# Patient Record
Sex: MALE | Race: WHITE | NOT HISPANIC OR LATINO | Employment: OTHER | URBAN - METROPOLITAN AREA
[De-identification: names, ages, dates, MRNs, and addresses within clinical notes are randomized per-mention and may not be internally consistent; named-entity substitution may affect disease eponyms.]

---

## 2022-02-11 ENCOUNTER — TRANSFERRED RECORDS (OUTPATIENT)
Dept: HEALTH INFORMATION MANAGEMENT | Facility: CLINIC | Age: 67
End: 2022-02-11
Payer: COMMERCIAL

## 2022-02-14 ENCOUNTER — TRANSCRIBE ORDERS (OUTPATIENT)
Dept: OTHER | Age: 67
End: 2022-02-14

## 2022-02-14 DIAGNOSIS — H43.393 FLOATERS, BILATERAL: Primary | ICD-10-CM

## 2022-02-15 ENCOUNTER — TELEPHONE (OUTPATIENT)
Dept: OPHTHALMOLOGY | Facility: CLINIC | Age: 67
End: 2022-02-15

## 2022-02-15 ENCOUNTER — TELEPHONE (OUTPATIENT)
Dept: OPHTHALMOLOGY | Facility: CLINIC | Age: 67
End: 2022-02-15
Payer: COMMERCIAL

## 2022-02-15 NOTE — TELEPHONE ENCOUNTER
[phone] home/cell    Left message with direct number at [phone]    Sammy Young RN 1:45 PM 02/15/22          [facility]    Phone Message    May a detailed message be left on voicemail: no     Reason for Call: Appointment Intake    Referring Provider Name: DAVID COTTRELL  Diagnosis and/or Symptoms: Floaters, bilateral    Action Taken: Other: Eye    Travel Screening: Not Applicable

## 2022-03-16 ENCOUNTER — OFFICE VISIT (OUTPATIENT)
Dept: OPHTHALMOLOGY | Facility: CLINIC | Age: 67
End: 2022-03-16
Attending: OPHTHALMOLOGY
Payer: COMMERCIAL

## 2022-03-16 DIAGNOSIS — H43.313 VITREOUS STRANDS OF BOTH EYES: Primary | ICD-10-CM

## 2022-03-16 DIAGNOSIS — H26.492 PCO (POSTERIOR CAPSULAR OPACIFICATION), LEFT: ICD-10-CM

## 2022-03-16 DIAGNOSIS — Z96.1 PSEUDOPHAKIA OF BOTH EYES: ICD-10-CM

## 2022-03-16 PROCEDURE — 99207 FUNDUS PHOTOS OU (BOTH EYES): CPT | Mod: 26 | Performed by: OPHTHALMOLOGY

## 2022-03-16 PROCEDURE — 92250 FUNDUS PHOTOGRAPHY W/I&R: CPT | Performed by: OPHTHALMOLOGY

## 2022-03-16 PROCEDURE — 66821 AFTER CATARACT LASER SURGERY: CPT | Mod: LT | Performed by: OPHTHALMOLOGY

## 2022-03-16 PROCEDURE — 92134 CPTRZ OPH DX IMG PST SGM RTA: CPT | Performed by: OPHTHALMOLOGY

## 2022-03-16 PROCEDURE — 99213 OFFICE O/P EST LOW 20 MIN: CPT | Mod: 57 | Performed by: OPHTHALMOLOGY

## 2022-03-16 PROCEDURE — G0463 HOSPITAL OUTPT CLINIC VISIT: HCPCS | Mod: 25

## 2022-03-16 PROCEDURE — 999N000103 HC STATISTIC NO CHARGE FACILITY FEE

## 2022-03-16 PROCEDURE — 99204 OFFICE O/P NEW MOD 45 MIN: CPT | Mod: 25 | Performed by: OPHTHALMOLOGY

## 2022-03-16 RX ORDER — METOPROLOL TARTRATE 25 MG/1
25 TABLET, FILM COATED ORAL 2 TIMES DAILY
COMMUNITY
Start: 2021-11-28 | End: 2022-03-31

## 2022-03-16 RX ORDER — EZETIMIBE 10 MG/1
10 TABLET ORAL
COMMUNITY
Start: 2021-10-11 | End: 2022-11-17

## 2022-03-16 RX ORDER — ATENOLOL 25 MG/1
TABLET ORAL
COMMUNITY
Start: 2021-08-09 | End: 2022-07-28

## 2022-03-16 RX ORDER — ROSUVASTATIN CALCIUM 5 MG/1
TABLET, COATED ORAL
COMMUNITY
Start: 2021-09-28

## 2022-03-16 RX ORDER — TRIAMCINOLONE ACETONIDE 1 MG/G
OINTMENT TOPICAL
COMMUNITY
Start: 2022-01-21

## 2022-03-16 RX ORDER — SILDENAFIL 50 MG/1
TABLET, FILM COATED ORAL
COMMUNITY
Start: 2022-03-10

## 2022-03-16 RX ORDER — ASPIRIN 325 MG
325 TABLET, DELAYED RELEASE (ENTERIC COATED) ORAL
COMMUNITY
Start: 2021-08-24

## 2022-03-16 ASSESSMENT — VISUAL ACUITY
METHOD: SNELLEN - LINEAR
OS_CC: 20/20
OD_CC: 20/20
METHOD: SNELLEN - LINEAR
OD_CC: 20/20
CORRECTION_TYPE: GLASSES
OD_CC+: -1
CORRECTION_TYPE: GLASSES
OD_CC+: -1
OS_CC: 20/20

## 2022-03-16 ASSESSMENT — SLIT LAMP EXAM - LIDS
COMMENTS: DERMATOCHALASIS

## 2022-03-16 ASSESSMENT — REFRACTION_WEARINGRX
OD_AXIS: 151
OS_AXIS: 023
OS_SPHERE: PLANO
OD_ADD: +2.75
OS_ADD: +2.75
OD_CYLINDER: +1.00
OS_SPHERE: PLANO
OS_AXIS: 023
OS_CYLINDER: +1.00
OD_SPHERE: +0.25
OD_AXIS: 151
OD_ADD: +2.75
OD_CYLINDER: +1.00
OD_SPHERE: +0.25
OS_ADD: +2.75
OS_CYLINDER: +1.00

## 2022-03-16 ASSESSMENT — TONOMETRY
OS_IOP_MMHG: 18
OS_IOP_MMHG: 18
OD_IOP_MMHG: 17
IOP_METHOD: TONOPEN
OD_IOP_MMHG: 17
IOP_METHOD: TONOPEN

## 2022-03-16 ASSESSMENT — CUP TO DISC RATIO
OS_RATIO: 0.4
OS_RATIO: 0.05
OD_RATIO: 0.4
OD_RATIO: 0.05

## 2022-03-16 ASSESSMENT — CONF VISUAL FIELD
OS_NORMAL: 1
OS_NORMAL: 1
OD_NORMAL: 1
OD_NORMAL: 1
METHOD: COUNTING FINGERS

## 2022-03-16 NOTE — PROGRESS NOTES
Chief Complaint(s) and History of Present Illness(es)     Retinal Evaluation     Pain scale: 0/10    Comments: Floaters               Comments     C/o floaters left eye > right  Pt states he has had 3 lasers for floaters in the past both eye  States no flashes, eye pain or headaches  Pt is DM type 2  LBS:  ?   Last A1C: 6.5  No results found for: A1C      Brandie Coy COT 8:21 AM March 16, 2022               Review of systems for the eyes was negative other than the pertinent positives/negatives listed in the HPI.      Assessment & Plan      Fabricio Govea is a 66 year old male with the following diagnoses:   1. Vitreous strands of both eyes    2. Pseudophakia of both eyes         Referral from Dr. Meade for persistent vitreous floaters in both eyes that are bothersome with ADLs  S/P YAG vitreolysis x 3 both eyes ([place], records not available)    Very anterior syneresis of significant density  Not amendable to further laser  Discussed trial of dilute atropine at bedtime for symptoms versus pars plana vitrectomy (PPV)   Will try to see retina clinic today to discuss surgery       Patient disposition:   Dr. Ann to see today         Attending Physician Attestation:  Complete documentation of historical and exam elements from today's encounter can be found in the full encounter summary report (not reduplicated in this progress note).  I personally obtained the chief complaint(s) and history of present illness.  I confirmed and edited as necessary the review of systems, past medical/surgical history, family history, social history, and examination findings as documented by others; and I examined the patient myself.  I personally reviewed the relevant tests, images, and reports as documented above.  I formulated and edited as necessary the assessment and plan and discussed the findings and management plan with the patient and family. . - Tk Coy MD

## 2022-03-16 NOTE — NURSING NOTE
Chief Complaints and History of Present Illnesses   Patient presents with     Retinal Evaluation     Floaters      Chief Complaint(s) and History of Present Illness(es)     Retinal Evaluation     Pain scale: 0/10    Comments: Floaters               Comments     C/o floaters left eye > right  Pt states he has had 3 lasers for floaters in the past both eye  States no flashes, eye pain or headaches  Pt is DM type 2  LBS:  ?   Last A1C: 6.5  No results found for: A1C      Brandie Coy COT 8:21 AM March 16, 2022

## 2022-03-16 NOTE — PROGRESS NOTES
CC -   Referral from Dr. 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 History of type 2 diabetes and coronary artery disease s/p CABG August 2021.     Has had persistent floaters in both eyes for the past few years that have been bothersome. Left eye appears to be somewhat worse than the right.     He feels that they are interfering with his ability to drive. Now has some difficulty with evening driving as well.     These floaters are disruptive to his wood working, as well photography and other significant activities of daily living.       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    ASSESSMENT & PLAN  # vitreous floaters both eyes  Complaining of floaters and glare left eye   Significant Anterior vitreous strands, posterior to the intraocular lens and with posterior capsular opacity (PCO)   Will plan yag capsulotomy left eye first and will reassess vitreous floaters after  - if persistent floaters, will plan for Pars plana vitrectomy /fluorectomy left eye first   risks discussed 1:1000 risk of infection/bleed/loss of eye; 1:100 risk of RD and need for further surgery.Patient aware of prolonged healing after retinal surgery (up to a year after surgery) as well as possibility of air/gas/SO  instillation into eye.     #CHRPE left eye   Baseline pic done today  Observe  Follow up 1 yr    # status post Cataract extraction intraocular lens   In [place] 3-4 ys ago  With  posterior capsular opacity (PCO) left eye > right eye   Patient complaining of both glare and floaters left eye> right eye   Recommend yag laser left eye   Risks, benefits and alternatives discussed with patient and agree to proceed   Will follow up in one week.    # Early Age related macular degeneration left eye   Weekly Amsler Grid use was discussed and training performed.  A diet of leafy green vegetables was encouraged.  Return precautions were given    Follow up one week with prescription and dilation      Genet Harris MD  Ophthalmology Resident PGY3  [facility]   ~~~~~~~~~~~~~~~~~~~~~~~~~~~~~~~~~~   Complete documentation of historical and exam elements from today's encounter can be found in the full encounter summary report (not reduplicated in this progress note).  I personally obtained the chief complaint(s) and history of present illness.  I confirmed and edited as necessary the review of systems, past medical/surgical history, family history, social history, and examination findings as documented by others; and I examined the patient myself.  I personally reviewed the relevant tests, images, and reports as documented above.  I personally reviewed the ophthalmic test(s) associated with this encounter, agree with the interpretation(s) as documented by the resident/fellow, and have edited the corresponding report(s) as necessary.   I formulated and edited as necessary the assessment and plan and discussed the findings and management plan with the patient and family and No resident or fellow assisted with the procedures performed.  I performed the procedures myself.    Xiao Ann MD   of Ophthalmology.  Retina Service   Department of Ophthalmology and Visual Neurosciences   [facility]  Phone: [phone]   Fax: [phone]

## 2022-03-23 ENCOUNTER — OFFICE VISIT (OUTPATIENT)
Dept: OPHTHALMOLOGY | Facility: CLINIC | Age: 67
End: 2022-03-23
Attending: OPHTHALMOLOGY
Payer: COMMERCIAL

## 2022-03-23 DIAGNOSIS — H26.492 PCO (POSTERIOR CAPSULAR OPACIFICATION), LEFT: Primary | ICD-10-CM

## 2022-03-23 DIAGNOSIS — H26.491 PCO (POSTERIOR CAPSULAR OPACIFICATION), RIGHT: ICD-10-CM

## 2022-03-23 PROCEDURE — G0463 HOSPITAL OUTPT CLINIC VISIT: HCPCS | Mod: 25

## 2022-03-23 PROCEDURE — 66821 AFTER CATARACT LASER SURGERY: CPT | Mod: RT | Performed by: OPHTHALMOLOGY

## 2022-03-23 ASSESSMENT — CUP TO DISC RATIO
OS_RATIO: 0.05
OD_RATIO: 0.05

## 2022-03-23 ASSESSMENT — REFRACTION_WEARINGRX
OD_AXIS: 160
OS_SPHERE: PLANO
OD_ADD: +2.25
OS_AXIS: 153
OS_CYLINDER: +0.75
SPECS_TYPE: PAL
OD_CYLINDER: +0.50
OS_ADD: +2.25
OD_SPHERE: +0.25

## 2022-03-23 ASSESSMENT — VISUAL ACUITY
METHOD: SNELLEN - LINEAR
CORRECTION_TYPE: GLASSES
OD_CC+: -1
OD_CC: 20/15
OS_CC: 20/15
OS_CC+: -1

## 2022-03-23 ASSESSMENT — REFRACTION_MANIFEST
OS_AXIS: 149
OS_ADD: +2.50
OS_CYLINDER: +0.50
OS_SPHERE: -0.25
OD_SPHERE: -0.25
OD_AXIS: 167
OD_CYLINDER: +0.50
OD_ADD: +2.50

## 2022-03-23 ASSESSMENT — TONOMETRY
OD_IOP_MMHG: 16
OS_IOP_MMHG: 20
IOP_METHOD: TONOPEN

## 2022-03-23 ASSESSMENT — SLIT LAMP EXAM - LIDS
COMMENTS: DERMATOCHALASIS
COMMENTS: DERMATOCHALASIS

## 2022-03-23 NOTE — PROGRESS NOTES
CC -   Referral from Dr. 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 History of type 2 diabetes and coronary artery disease s/p CABG August 2021.     Has had persistent floaters in both eyes for the past few years that have been bothersome. Left eye appears to be somewhat worse than the right.     He feels that they are interfering with his ability to drive. Now has some difficulty with evening driving as well.     These floaters are disruptive to his wood working, as well photography and other significant activities of daily living.       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    ASSESSMENT & PLAN    # status post  yag capsulotomy left eye  Reports improvement of glare and floaters  Wants to hold Pars plana vitrectomy (PPV) left eye because of  Improvement after yag and will reassess in the future    Wants yag right eye   Occasional floater left eye: will enhanced left eye     # posterior capsular opacity (PCO) right eye   Risks, benefits and alternatives discussed with patient and agreed to proceed  Follow up in one week     # vitreous floaters both eyes  - if persistent floaters, will plan for Pars plana vitrectomy /fluorectomy in the future  risks discussed 1:1000 risk of infection/bleed/loss of eye; 1:100 risk of RD and need for further surgery.Patient aware of prolonged healing after retinal surgery (up to a year after surgery) as well as possibility of  air/gas/SO  instillation into eye.     #CHRPE left eye   Baseline pic done today  Observe  Follow up 1 yr    # status post Cataract extraction intraocular lens   In [place] 3-4 ys ago  Recommend yag laser right eye   Risks, benefits and alternatives discussed with patient and agree to proceed   Will follow up in one week.    # Early Age related macular degeneration left eye   Weekly Amsler Grid use was discussed and training performed.  A diet of leafy green vegetables was encouraged.  Return precautions were given    Yag left eye enhancemnt  Pre-operative diagnosis:  Posterior capsular opacification    Post-operative diagnosis:   same.  Procedure performed:   Yag laser   Anesthesia:     Topical proparacaine 0.5% drops  Complications:   None.    Description of the procedure:    Informed consent was obtained from the patient.  The patient was brought to the laser room where yag laser was performed. Parameters:  - Power:  1.2 mJ  -Total spots: 10    The patient tolerated the procedure well.  There were no complications.   The patient  left the clinic in stable condition.      Follow up one week with prescription and dilation of both eyes     ~~~~~~~~~~~~~~~~~~~~~~~~~~~~~~~~~~   Complete documentation of historical and exam elements from today's encounter can be found in the full encounter summary report (not reduplicated in this progress note).  I personally obtained the chief complaint(s) and history of present illness.  I confirmed and edited as necessary the review of systems, past medical/surgical history, family history, social history, and examination findings as documented by others; and I examined the patient myself.  I personally reviewed the relevant tests, images, and reports as documented above.  I formulated and edited as necessary the assessment and plan and discussed the findings and management plan with the patient and family and No resident or fellow assisted with the procedures performed.  I performed  the procedures myself.    Xiao Ann MD   of Ophthalmology.  Retina Service   Department of Ophthalmology and Visual Neurosciences   [facility]  Phone: [phone]   Fax: [phone]

## 2022-03-23 NOTE — NURSING NOTE
Chief Complaints and History of Present Illnesses   Patient presents with     Follow Up      Chief Complaint(s) and History of Present Illness(es)     Follow Up     Laterality: left eye    Associated symptoms: floaters.  Negative for eye pain, redness, tearing and flashes              Comments     Follow up s/p YAG left eye 3/16/2022.  Vision improved left eye.  Right eye still has floaters.  Eye meds: none  NORAH Ulloa 3/23/2022 11:48 AM

## 2022-03-31 ENCOUNTER — OFFICE VISIT (OUTPATIENT)
Dept: OPHTHALMOLOGY | Facility: CLINIC | Age: 67
End: 2022-03-31
Attending: OPHTHALMOLOGY
Payer: COMMERCIAL

## 2022-03-31 DIAGNOSIS — H43.393 VITREOUS FLOATER, BILATERAL: ICD-10-CM

## 2022-03-31 DIAGNOSIS — Z98.890 POST-OPERATIVE STATE: Primary | ICD-10-CM

## 2022-03-31 PROCEDURE — G0463 HOSPITAL OUTPT CLINIC VISIT: HCPCS

## 2022-03-31 PROCEDURE — 99024 POSTOP FOLLOW-UP VISIT: CPT | Mod: GC | Performed by: OPHTHALMOLOGY

## 2022-03-31 ASSESSMENT — REFRACTION_WEARINGRX
OS_ADD: +2.25
OS_SPHERE: PLANO
OD_SPHERE: +0.25
OS_CYLINDER: +0.75
SPECS_TYPE: PAL
OS_AXIS: 153
OD_AXIS: 160
OD_CYLINDER: +0.50
OD_ADD: +2.25

## 2022-03-31 ASSESSMENT — VISUAL ACUITY
OS_CC: 20/15
OD_CC+: -1
OD_CC: 20/15
METHOD: SNELLEN - LINEAR

## 2022-03-31 ASSESSMENT — CONF VISUAL FIELD
OD_NORMAL: 1
METHOD: COUNTING FINGERS
OS_NORMAL: 1

## 2022-03-31 ASSESSMENT — SLIT LAMP EXAM - LIDS
COMMENTS: DERMATOCHALASIS
COMMENTS: DERMATOCHALASIS

## 2022-03-31 ASSESSMENT — TONOMETRY
IOP_METHOD: TONOPEN
OS_IOP_MMHG: 18
OD_IOP_MMHG: 17

## 2022-03-31 ASSESSMENT — CUP TO DISC RATIO
OD_RATIO: 0.05
OS_RATIO: 0.05

## 2022-03-31 NOTE — PROGRESS NOTES
CC -   Status post yag both eyes     INTERVAL HISTORY - reports improvement of vision and floaters in both eyes. 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 History of type 2 diabetes and coronary artery disease s/p CABG August 2021.     Has had persistent floaters in both eyes for the past few years that have been bothersome. Left eye appears to be somewhat worse than the right.     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 Whitney)   - s/p YAG OD 3/23/2022   - s/p YAG OS 3/16/22     # vitreous floaters both eyes  -3/16/2022: if persistent floaters, will plan for Pars plana vitrectomy /fluorectomy in the future. risks discussed 1:1000 risk of infection/bleed/loss of eye; 1:100 risk of RD and need for further surgery.Patient aware of prolonged healing after retinal surgery (up to a year after surgery) as well as possibility of air/gas/SO  instillation into eye.   03/31/22  He does notice improvement s/p YAG, although right eye has a persistent on and off floater . It impairs his photography hobby activities.      Will montior for now and re-evaluate in 2-3 months.     #CHRPE left eye   Baseline pic done 3/16/22  Observe  Follow up 1 yr      # Early Age related macular degeneration left eye   Weekly Amsler Grid use was discussed and training performed.  A diet of leafy green vegetables was  encouraged.  Return precautions were given    RTC 2-3 months for DFE, eval for floaters    Raffi Hugo MD  Retina Fellow, PGY5    ~~~~~~~~~~~~~~~~~~~~~~~~~~~~~~~~~~   Complete documentation of historical and exam elements from today's encounter can be found in the full encounter summary report (not reduplicated in this progress note).  I personally obtained the chief complaint(s) and history of present illness.  I confirmed and edited as necessary the review of systems, past medical/surgical history, family history, social history, and examination findings as documented by others; and I examined the patient myself.  I personally reviewed the relevant tests, images, and reports as documented above.  I formulated and edited as necessary the assessment and plan and discussed the findings and management plan with the patient and family    Xiao Ann MD   of Ophthalmology.  Retina Service   Department of Ophthalmology and Visual Neurosciences   [facility]  Phone: [phone]   Fax: [phone]

## 2022-03-31 NOTE — NURSING NOTE
Chief Complaints and History of Present Illnesses   Patient presents with     Post Op (Ophthalmology) Both Eyes     Chief Complaint(s) and History of Present Illness(es)     Post Op (Ophthalmology) Both Eyes     Laterality: both eyes    Associated symptoms: floaters.  Negative for eye pain, flashes, redness, itching and discharge    Response to treatment: moderate improvement    Pain scale: 0/10              Comments     Fabricio is here one week post YAG laser. He says vision is good, but still a floater RECarissa Hylton COT 12:03 PM March 31, 2022

## 2022-04-02 ENCOUNTER — MYC MEDICAL ADVICE (OUTPATIENT)
Dept: OPHTHALMOLOGY | Facility: CLINIC | Age: 67
End: 2022-04-02
Payer: COMMERCIAL

## 2022-04-07 ENCOUNTER — TELEPHONE (OUTPATIENT)
Dept: OPHTHALMOLOGY | Facility: CLINIC | Age: 67
End: 2022-04-07
Payer: COMMERCIAL

## 2022-04-07 DIAGNOSIS — Z11.59 ENCOUNTER FOR SCREENING FOR OTHER VIRAL DISEASES: Primary | ICD-10-CM

## 2022-04-07 PROBLEM — H43.393 VITREOUS FLOATER, BILATERAL: Status: ACTIVE | Noted: 2022-04-07

## 2022-04-07 NOTE — TELEPHONE ENCOUNTER
Patient is scheduled for surgery with Dr. Xiao Ann     Spoke with: Fabricio     Date of Surgery: 04/19     Location: [facility]:  [address] [place] [address]     Informed patient they will need an adult : Yes     H&P will be completed at: [facility]: [email or web address]     Post Op scheduled on 04/20 and 04/27     Surgery packet was mailed 04/07     Additional comments: Advised RN will call 1 - 2 business days prior with arrival time and instructions.

## 2022-04-09 ENCOUNTER — HEALTH MAINTENANCE LETTER (OUTPATIENT)
Age: 67
End: 2022-04-09

## 2022-04-12 ENCOUNTER — PREP FOR PROCEDURE (OUTPATIENT)
Dept: OPHTHALMOLOGY | Facility: CLINIC | Age: 67
End: 2022-04-12
Payer: COMMERCIAL

## 2022-04-16 ENCOUNTER — LAB (OUTPATIENT)
Dept: LAB | Facility: CLINIC | Age: 67
End: 2022-04-16
Attending: OPHTHALMOLOGY
Payer: COMMERCIAL

## 2022-04-16 DIAGNOSIS — Z11.59 ENCOUNTER FOR SCREENING FOR OTHER VIRAL DISEASES: ICD-10-CM

## 2022-04-16 LAB — SARS-COV-2 RNA RESP QL NAA+PROBE: NEGATIVE

## 2022-04-16 PROCEDURE — U0005 INFEC AGEN DETEC AMPLI PROBE: HCPCS

## 2022-04-18 ENCOUNTER — ANESTHESIA EVENT (OUTPATIENT)
Dept: SURGERY | Facility: AMBULATORY SURGERY CENTER | Age: 67
End: 2022-04-18
Payer: COMMERCIAL

## 2022-04-19 ENCOUNTER — HOSPITAL ENCOUNTER (OUTPATIENT)
Facility: AMBULATORY SURGERY CENTER | Age: 67
Discharge: HOME | End: 2022-04-19
Attending: OPHTHALMOLOGY
Payer: COMMERCIAL

## 2022-04-19 ENCOUNTER — ANESTHESIA (OUTPATIENT)
Dept: SURGERY | Facility: AMBULATORY SURGERY CENTER | Age: 67
End: 2022-04-19
Payer: COMMERCIAL

## 2022-04-19 VITALS
WEIGHT: 212 LBS | TEMPERATURE: 97.1 F | HEART RATE: 82 BPM | HEIGHT: 70 IN | DIASTOLIC BLOOD PRESSURE: 71 MMHG | RESPIRATION RATE: 16 BRPM | BODY MASS INDEX: 30.35 KG/M2 | OXYGEN SATURATION: 98 % | SYSTOLIC BLOOD PRESSURE: 122 MMHG

## 2022-04-19 DIAGNOSIS — H43.393 VITREOUS FLOATER, BILATERAL: ICD-10-CM

## 2022-04-19 LAB — GLUCOSE BLDC GLUCOMTR-MCNC: 138 MG/DL (ref 70–99)

## 2022-04-19 PROCEDURE — 82962 GLUCOSE BLOOD TEST: CPT | Performed by: PATHOLOGY

## 2022-04-19 PROCEDURE — 67036 REMOVAL OF INNER EYE FLUID: CPT | Mod: 79 | Performed by: OPHTHALMOLOGY

## 2022-04-19 PROCEDURE — 67036 REMOVAL OF INNER EYE FLUID: CPT | Mod: RT

## 2022-04-19 RX ORDER — FENTANYL CITRATE 50 UG/ML
INJECTION, SOLUTION INTRAMUSCULAR; INTRAVENOUS PRN
Status: DISCONTINUED | OUTPATIENT
Start: 2022-04-19 | End: 2022-04-19

## 2022-04-19 RX ORDER — MEPERIDINE HYDROCHLORIDE 25 MG/ML
12.5 INJECTION INTRAMUSCULAR; INTRAVENOUS; SUBCUTANEOUS
Status: DISCONTINUED | OUTPATIENT
Start: 2022-04-19 | End: 2022-04-20 | Stop reason: HOSPADM

## 2022-04-19 RX ORDER — PROPOFOL 10 MG/ML
INJECTION, EMULSION INTRAVENOUS PRN
Status: DISCONTINUED | OUTPATIENT
Start: 2022-04-19 | End: 2022-04-19

## 2022-04-19 RX ORDER — FENTANYL CITRATE 50 UG/ML
25 INJECTION, SOLUTION INTRAMUSCULAR; INTRAVENOUS EVERY 5 MIN PRN
Status: DISCONTINUED | OUTPATIENT
Start: 2022-04-19 | End: 2022-04-19 | Stop reason: HOSPADM

## 2022-04-19 RX ORDER — SODIUM CHLORIDE, SODIUM LACTATE, POTASSIUM CHLORIDE, CALCIUM CHLORIDE 600; 310; 30; 20 MG/100ML; MG/100ML; MG/100ML; MG/100ML
INJECTION, SOLUTION INTRAVENOUS CONTINUOUS
Status: DISCONTINUED | OUTPATIENT
Start: 2022-04-19 | End: 2022-04-19 | Stop reason: HOSPADM

## 2022-04-19 RX ORDER — ACETAMINOPHEN 325 MG/1
975 TABLET ORAL ONCE
Status: COMPLETED | OUTPATIENT
Start: 2022-04-19 | End: 2022-04-19

## 2022-04-19 RX ORDER — TETRACAINE HYDROCHLORIDE 5 MG/ML
SOLUTION OPHTHALMIC PRN
Status: DISCONTINUED | OUTPATIENT
Start: 2022-04-19 | End: 2022-04-19 | Stop reason: HOSPADM

## 2022-04-19 RX ORDER — ONDANSETRON 4 MG/1
4 TABLET, ORALLY DISINTEGRATING ORAL EVERY 30 MIN PRN
Status: DISCONTINUED | OUTPATIENT
Start: 2022-04-19 | End: 2022-04-20 | Stop reason: HOSPADM

## 2022-04-19 RX ORDER — CYCLOPENTOLAT/TROPIC/PHENYLEPH 1%-1%-2.5%
1 DROPS (EA) OPHTHALMIC (EYE)
Status: COMPLETED | OUTPATIENT
Start: 2022-04-19 | End: 2022-04-19

## 2022-04-19 RX ORDER — ATROPINE SULFATE 10 MG/ML
SOLUTION/ DROPS OPHTHALMIC PRN
Status: DISCONTINUED | OUTPATIENT
Start: 2022-04-19 | End: 2022-04-19 | Stop reason: HOSPADM

## 2022-04-19 RX ORDER — ATROPINE SULFATE 10 MG/ML
1 SOLUTION/ DROPS OPHTHALMIC AT BEDTIME
Qty: 5 ML | Refills: 0 | Status: SHIPPED | OUTPATIENT
Start: 2022-04-19

## 2022-04-19 RX ORDER — LIDOCAINE HYDROCHLORIDE 20 MG/ML
INJECTION, SOLUTION INFILTRATION; PERINEURAL PRN
Status: DISCONTINUED | OUTPATIENT
Start: 2022-04-19 | End: 2022-04-19

## 2022-04-19 RX ORDER — SODIUM CHLORIDE, SODIUM LACTATE, POTASSIUM CHLORIDE, CALCIUM CHLORIDE 600; 310; 30; 20 MG/100ML; MG/100ML; MG/100ML; MG/100ML
INJECTION, SOLUTION INTRAVENOUS CONTINUOUS
Status: DISCONTINUED | OUTPATIENT
Start: 2022-04-19 | End: 2022-04-20 | Stop reason: HOSPADM

## 2022-04-19 RX ORDER — DEXAMETHASONE SODIUM PHOSPHATE 4 MG/ML
INJECTION, SOLUTION INTRA-ARTICULAR; INTRALESIONAL; INTRAMUSCULAR; INTRAVENOUS; SOFT TISSUE PRN
Status: DISCONTINUED | OUTPATIENT
Start: 2022-04-19 | End: 2022-04-19 | Stop reason: HOSPADM

## 2022-04-19 RX ORDER — FENTANYL CITRATE 50 UG/ML
25 INJECTION, SOLUTION INTRAMUSCULAR; INTRAVENOUS
Status: DISCONTINUED | OUTPATIENT
Start: 2022-04-19 | End: 2022-04-20 | Stop reason: HOSPADM

## 2022-04-19 RX ORDER — BALANCED SALT SOLUTION 6.4; .75; .48; .3; 3.9; 1.7 MG/ML; MG/ML; MG/ML; MG/ML; MG/ML; MG/ML
SOLUTION OPHTHALMIC PRN
Status: DISCONTINUED | OUTPATIENT
Start: 2022-04-19 | End: 2022-04-19 | Stop reason: HOSPADM

## 2022-04-19 RX ORDER — ONDANSETRON 2 MG/ML
4 INJECTION INTRAMUSCULAR; INTRAVENOUS EVERY 30 MIN PRN
Status: DISCONTINUED | OUTPATIENT
Start: 2022-04-19 | End: 2022-04-20 | Stop reason: HOSPADM

## 2022-04-19 RX ORDER — TIMOLOL MALEATE 5 MG/ML
SOLUTION/ DROPS OPHTHALMIC PRN
Status: DISCONTINUED | OUTPATIENT
Start: 2022-04-19 | End: 2022-04-19 | Stop reason: HOSPADM

## 2022-04-19 RX ORDER — NEOMYCIN POLYMYXIN B SULFATES AND DEXAMETHASONE 3.5; 10000; 1 MG/ML; [USP'U]/ML; MG/ML
1 SUSPENSION/ DROPS OPHTHALMIC 4 TIMES DAILY
Qty: 5 ML | Refills: 0 | Status: SHIPPED | OUTPATIENT
Start: 2022-04-19

## 2022-04-19 RX ADMIN — LIDOCAINE HYDROCHLORIDE 60 MG: 20 INJECTION, SOLUTION INFILTRATION; PERINEURAL at 10:41

## 2022-04-19 RX ADMIN — ACETAMINOPHEN 975 MG: 325 TABLET ORAL at 09:42

## 2022-04-19 RX ADMIN — Medication 1 DROP: at 09:52

## 2022-04-19 RX ADMIN — SODIUM CHLORIDE, SODIUM LACTATE, POTASSIUM CHLORIDE, CALCIUM CHLORIDE: 600; 310; 30; 20 INJECTION, SOLUTION INTRAVENOUS at 11:15

## 2022-04-19 RX ADMIN — PROPOFOL 40 MG: 10 INJECTION, EMULSION INTRAVENOUS at 10:42

## 2022-04-19 RX ADMIN — FENTANYL CITRATE 50 MCG: 50 INJECTION, SOLUTION INTRAMUSCULAR; INTRAVENOUS at 10:42

## 2022-04-19 RX ADMIN — Medication 1 DROP: at 09:46

## 2022-04-19 RX ADMIN — Medication 1 DROP: at 09:42

## 2022-04-19 RX ADMIN — SODIUM CHLORIDE, SODIUM LACTATE, POTASSIUM CHLORIDE, CALCIUM CHLORIDE: 600; 310; 30; 20 INJECTION, SOLUTION INTRAVENOUS at 10:04

## 2022-04-19 NOTE — OP NOTE
SURGEON: TRUDI HERRERA  PREOPERATIVE DIAGNOSIS:  Visually significant vitreous opacity right eye  POSTOPERATIVE DIAGNOSIS: same   SURGERY    25 gauge pars plana vitectomy, air-fluid exchange partial right eye  Complications:    none   Anesthesia:    MAC and peribulbar block right eye   Blood loss:    Scant  INDICATIONS;   Fabricio Govea is a 66 year old patient with a diagnosis Visually significant vitreous opacity right ey . He is here for surgical repair  DESCRIPTION OF THE PROCEDURE:  The patient was taken to the operating room where intravenous sedation was administered by the anesthesia department and a retrobulbar block consisting of a 1:1 mixture of 2%lidocaine and 0.75% marcaine with epinephrine and wydase, was administered to the operative eye with adequate anesthesia and akinesia.    The eye was prepped and draped in the usual sterile surgical fashion for ophthalmic surgery, including the installation of one drop of 5% Povidone Iodine.  A sterile drape was placed over the face and body and a lid speculum was inserted.      A 25 gauge infusion cannula was placed 3.5 mm posterior to the limbus inferotemporally. The infusion cannula was connected and its intravitreal location was verified by direct visualization. The infusion was turned on.  Two other 25 gauge trocars were placed 3.5 mm posterior to the limbus at 10:00 o'clock and 2:00 o'clock position. The vitrectomy handpiece and endoilluminator were placed in the eye.  Upon entering the eye, it was noted the pt had vitreous opacities. A vitrectomy was performed. Next approx 0.1 ml of kenalog was injected. Careful peripheral vitrectomy was performed with scleral depression 360 degrees.     Next, an air fluid exchange was performed. Next, the trocars and the infusion line were removed. The sclerotomies and conjunctiva were closed with 6-0 plain gut sutures. Subconjunctival injections of ancef and dexamethasone were administed The lid speculum was  removed. The eye was cleaned with wet and dry gauze. Maxitrol ointment and Atropine, and timolol drops were placed on the eye.  A patch and Steel shield were placed over the eye.   The patient tolerated well the procedure and was transferred to the PACU in stable condition.    The surgery was assisted by Dr.Tahsin Mccallum. Due to the delicate and complex nature of this surgery, an assistant was required. He assisted with vitrectomy. I was present for the entire surgery.

## 2022-04-19 NOTE — ANESTHESIA POSTPROCEDURE EVALUATION
Patient: Fabricio Govea    Procedure: Procedure(s):  RIGHT EYE VITRECTOMY, PARS PLANA APPROACH, USING 25-GAUGE INSTRUMENTS       Anesthesia Type:  MAC    Note:  Disposition: Outpatient   Postop Pain Control: Uneventful            Sign Out: Well controlled pain   PONV: No   Neuro/Psych: Uneventful            Sign Out: Acceptable/Baseline neuro status   Airway/Respiratory: Uneventful            Sign Out: Acceptable/Baseline resp. status   CV/Hemodynamics: Uneventful            Sign Out: Acceptable CV status; No obvious hypovolemia; No obvious fluid overload   Other NRE: NONE   DID A NON-ROUTINE EVENT OCCUR? No           Last vitals:  Vitals Value Taken Time   /71 04/19/22 1140   Temp 36.2  C (97.1  F) 04/19/22 1140   Pulse 82 04/19/22 1140   Resp 16 04/19/22 1140   SpO2 98 % 04/19/22 1140       Electronically Signed By: MINE CHAPA MD  April 19, 2022  11:43 AM

## 2022-04-19 NOTE — ANESTHESIA PREPROCEDURE EVALUATION
Anesthesia Pre-Procedure Evaluation    Patient: Fabricio Govea   MRN: [id number] : 1955        Procedure : Procedure(s):  RIGHT EYE VITRECTOMY, PARS PLANA APPROACH, USING 25-GAUGE INSTRUMENTS          Past Medical History:   Diagnosis Date     Diabetes (H)       Past Surgical History:   Procedure Laterality Date     CATARACT IOL, RT/LT        Allergies   Allergen Reactions     Hmg-Coa-R Inhibitors Other (See Comments)     Intolerance with myalgia-severe  Statin Intolerance Documentation  2. Shared decision making discussion with patient regarding statins and patient choses to not trial a second or additional statin.     Isosorbide Headache     Severe      Codeine Rash     Lightheaded-feels like going to vomit     Semaglutide Rash     complete body rash 6-8 weeks to get out of system      Social History     Tobacco Use     Smoking status: Never Smoker     Smokeless tobacco: Never Used   Substance Use Topics     Alcohol use: Yes     Comment: occ      Wt Readings from Last 1 Encounters:   22 96.2 kg (212 lb)        Anesthesia Evaluation   Pt has had prior anesthetic.         ROS/MED HX  ENT/Pulmonary:       Neurologic:       Cardiovascular:     (+) Dyslipidemia hypertension--CAD -CABG-date: . -    METS/Exercise Tolerance: >4 METS    Hematologic:       Musculoskeletal:       GI/Hepatic:       Renal/Genitourinary:       Endo:     (+) type II DM,     Psychiatric/Substance Use:       Infectious Disease:       Malignancy:       Other:            Physical Exam    Airway        Mallampati: II   TM distance: > 3 FB   Neck ROM: full   Mouth opening: > 3 cm    Respiratory Devices and Support         Dental  no notable dental history         Cardiovascular   cardiovascular exam normal          Pulmonary   pulmonary exam normal                OUTSIDE LABS:  CBC: No results found for: WBC, HGB, HCT, PLT  BMP: No results found for: NA, POTASSIUM, CHLORIDE, CO2, BUN, CR, GLC  COAGS: No results found for: PTT,  INR, FIBR  POC: No results found for: BGM, HCG, HCGS  HEPATIC: No results found for: ALBUMIN, PROTTOTAL, ALT, AST, GGT, ALKPHOS, BILITOTAL, BILIDIRECT, SURYA  OTHER: No results found for: PH, LACT, A1C, PAT, PHOS, MAG, LIPASE, AMYLASE, TSH, T4, T3, CRP, SED    Anesthesia Plan    ASA Status:  3   NPO Status:  NPO Appropriate    Anesthesia Type: MAC.     - Reason for MAC: chronic cardiopulmonary disease, straight local not clinically adequate   Induction: Intravenous.   Maintenance: TIVA.        Consents    Anesthesia Plan(s) and associated risks, benefits, and realistic alternatives discussed. Questions answered and patient/representative(s) expressed understanding.    - Discussed:     - Discussed with:  Patient         Postoperative Care    Pain management: IV analgesics.   PONV prophylaxis: Dexamethasone or Solumedrol, Ondansetron (or other 5HT-3)     Comments:                MINE CHAPA MD

## 2022-04-19 NOTE — DISCHARGE INSTRUCTIONS
[facility]  Home Care Following Anesthesia  For 24 hours after surgery:  Get plenty of rest.  A responsible adult must stay with you for at least 24 hours after you leave the surgery center.  Do not drive or use heavy equipment.  If you have weakness or tingling, don't drive or use heavy equipment until this feeling goes away.   Do not drink alcohol.   Avoid strenuous or risky activities.  Ask for help when climbing stairs.  You may feel lightheaded.  IF so, sit for a few minutes before standing.  Have someone help you get up.   If you have nausea (feel sick to your stomach): Drink only clear liquids such as apple juice, ginger ale, broth or 7-Up.  Rest may also help.  Be sure to drink enough fluids.  Move to a regular diet as you feel able.   You may have a slight fever.  Call the doctor if your fever is over 100 F (37.7 C) (taken under the tongue) or lasts longer than 24 hours.  Do not make important or legal decisions.   It is recommended to avoid smoking.               Tylenol/Acetaminophen Consumption  To help encourage the safe use of acetaminophen, the makers of TYLENOL  have lowered the maximum daily dose for single-ingredient Extra Strength TYLENOL  (acetaminophen) products sold in the [place] from 8 pills per day (4,000 mg) to 6 pills per day (3,000 mg). The dosing interval has also changed from 2 pills every 4-6 hours to 2 pills every 6 hours.  If you feel your pain relief is insufficient, you may take Tylenol/Acetaminophen in addition to your narcotic pain medication.   Be careful not to exceed 3,000 mg of Tylenol/Acetaminophen in a 24 hour period from all sources.  If you are taking extra strength Tylenol/acetaminophen (500 mg), the maximum dose is 6 tablets in 24 hours.  If you are taking regular strength acetaminophen (325 mg), the maximum dose is 9 tablets in 24 hours.    Call a doctor for any of the following:  Signs of infection (fever, growing tenderness at the  surgery site, a large amount of drainage or bleeding, severe pain, foul-smelling drainage, redness, swelling).  It has been over 8 to 10 hours since surgery and you are still not able to urinate (pass water).  Headache for over 24 hours.  Signs of Covid-19 infection (temperature over 100 degrees, shortness of breath, cough, loss of taste/smell, generalized body aches, persistent headache, chills, sore throat, nausea/vomiting/diarrhea)      Your doctor is:       Dr. Xiao Ann, Ophthalmology: [phone]  (Monday-Friday 8-4)             Or for After Hours concerns: dial [phone] and ask for the resident on call for:  Ophthalmology  For emergency care, call the:  [facility]:  [phone] (TTY for hearing impaired: [phone])

## 2022-04-19 NOTE — ANESTHESIA CARE TRANSFER NOTE
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    Anesthesia Type:   MAC     Note:    Oropharynx: oropharynx clear of all foreign objects and spontaneously breathing  Level of Consciousness: awake  Oxygen Supplementation: room air    Independent Airway: airway patency satisfactory and stable  Dentition: dentition unchanged  Vital Signs Stable: post-procedure vital signs reviewed and stable  Report to RN Given: handoff report given  Patient transferred to: Phase II    Handoff Report: Identifed the Patient, Identified the Reponsible Provider, Reviewed the pertinent medical history, Discussed the surgical course, Reviewed Intra-OP anesthesia mangement and issues during anesthesia, Set expectations for post-procedure period and Allowed opportunity for questions and acknowledgement of understanding      Vitals:  Vitals Value Taken Time   BP     Temp     Pulse     Resp     SpO2         Electronically Signed By: SMITH SMITH  April 19, 2022  11:15 AM

## 2022-04-20 ENCOUNTER — OFFICE VISIT (OUTPATIENT)
Dept: OPHTHALMOLOGY | Facility: CLINIC | Age: 67
End: 2022-04-20
Attending: OPHTHALMOLOGY
Payer: COMMERCIAL

## 2022-04-20 DIAGNOSIS — Z98.890 POST-OPERATIVE STATE: Primary | ICD-10-CM

## 2022-04-20 PROCEDURE — 99024 POSTOP FOLLOW-UP VISIT: CPT | Mod: GC | Performed by: OPHTHALMOLOGY

## 2022-04-20 PROCEDURE — G0463 HOSPITAL OUTPT CLINIC VISIT: HCPCS

## 2022-04-20 ASSESSMENT — VISUAL ACUITY
OS_SC: 20/20
OD_SC+: -1
METHOD: SNELLEN - LINEAR
OD_SC: 20/40
OD_PH_SC: 20/30
OS_SC+: -2

## 2022-04-20 ASSESSMENT — SLIT LAMP EXAM - LIDS: COMMENTS: DERMATOCHALASIS

## 2022-04-20 ASSESSMENT — TONOMETRY
OS_IOP_MMHG: 09
OD_IOP_MMHG: 13
IOP_METHOD: TONOPEN

## 2022-04-20 ASSESSMENT — CUP TO DISC RATIO: OD_RATIO: 0.05

## 2022-04-20 NOTE — PATIENT INSTRUCTIONS
Start maxitrol drops 1 drop 4 times per day right eye   Start maxitrol ointment at bedtime right eye   Start atropine 1 drop daily right eye     Keep head of bed elevated for the next 2-3 days     Shield at bedtime     No water in the eye, no lifting above 10 lbs for 2 weeks    Reduced vision, increased eye pain, redness, call the clinic right away to be evaluated

## 2022-04-20 NOTE — PROGRESS NOTES
POD1 s/p 25GPPV, AFX, right eye for visually significant vitreous opacity 04/20/22     Eye a bit scratchy, vision is good and floaters are gone. Good VA and IOP, retina attached, bubble superior.    Plan:   Start maxitrol drops QID right eye   Start maxitrol ointment at bedtime right eye   Start atropine daily right eye   Head of bed elevation (no flat)  Shield at bedtime  Activity restrictions, no water in the eye  Return precautions discussed    Eileen Begum MD  Ophthalmology Resident, PGY-3  [facility]         ~~~~~~~~~~~~~~~~~~~~~~~~~~~~~~~~~~   Complete documentation of historical and exam elements from today's encounter can be found in the full encounter summary report (not reduplicated in this progress note).  I personally obtained the chief complaint(s) and history of present illness.  I confirmed and edited as necessary the review of systems, past medical/surgical history, family history, social history, and examination findings as documented by others; and I examined the patient myself.  I personally reviewed the relevant tests, images, and reports as documented above.  I formulated and edited as necessary the assessment and plan and discussed the findings and management plan with the patient and family    Xiao Ann MD   of Ophthalmology.  Retina Service   Department of Ophthalmology and Visual Neurosciences   [facility]  Phone: [phone]   Fax: [phone]

## 2022-04-20 NOTE — NURSING NOTE
Chief Complaints and History of Present Illnesses   Patient presents with     Post Op (Ophthalmology) Left Eye     1 day post op RIGHT EYE VITRECTOMY, PARS PLANA APPROACH, USING 25-GAUGE INSTRUMENTS  Slept ok. feels scratchy and FB sensation  Prabha MEZA 8:20 AM April 20, 2022        Chief Complaint(s) and History of Present Illness(es)     Post Op (Ophthalmology) Left Eye     Laterality: left eye    Comments: 1 day post op RIGHT EYE VITRECTOMY, PARS PLANA APPROACH, USING 25-GAUGE INSTRUMENTS  Slept ok. feels scratchy and FB sensation  Prabha MEZA 8:20 AM April 20, 2022

## 2022-04-27 ENCOUNTER — OFFICE VISIT (OUTPATIENT)
Dept: OPHTHALMOLOGY | Facility: CLINIC | Age: 67
End: 2022-04-27
Attending: OPHTHALMOLOGY
Payer: COMMERCIAL

## 2022-04-27 DIAGNOSIS — Z48.810 AFTERCARE FOLLOWING SURGERY OF A SENSORY ORGAN: Primary | ICD-10-CM

## 2022-04-27 PROCEDURE — 99024 POSTOP FOLLOW-UP VISIT: CPT | Performed by: OPHTHALMOLOGY

## 2022-04-27 PROCEDURE — G0463 HOSPITAL OUTPT CLINIC VISIT: HCPCS

## 2022-04-27 ASSESSMENT — VISUAL ACUITY
OD_SC: 20/20
CORRECTION_TYPE: GLASSES
OD_SC+: -2
METHOD: SNELLEN - LINEAR
OS_SC: 20/20

## 2022-04-27 ASSESSMENT — REFRACTION_WEARINGRX
OD_AXIS: 160
OD_CYLINDER: +0.50
OS_SPHERE: PLANO
OD_SPHERE: +0.25
OS_CYLINDER: +0.75
OS_ADD: +2.25
OD_ADD: +2.25
SPECS_TYPE: PAL
OS_AXIS: 153

## 2022-04-27 ASSESSMENT — TONOMETRY
IOP_METHOD: TONOPEN
OD_IOP_MMHG: 15
OS_IOP_MMHG: 15

## 2022-04-27 ASSESSMENT — CUP TO DISC RATIO: OD_RATIO: 0.05

## 2022-04-27 ASSESSMENT — SLIT LAMP EXAM - LIDS: COMMENTS: DERMATOCHALASIS

## 2022-04-27 NOTE — PROGRESS NOTES
POW1 s/p 25GPPV, AFX, right eye for visually significant vitreous opacity 04/20/22   Good VA and IOP, retina attached    Plan:   Start maxitrol drops Three times a day x 1 week, then twice a day x 1 week and then once a day till finish   right eye. Ok to continue maxitrol ointment right eye after drops finish   atropine stop   Shield at bedtime  Activity restrictions, no water in the eye  Return precautions discussed    Follow up in 3 weeks with Optical Coherence Tomography     ~~~~~~~~~~~~~~~~~~~~~~~~~~~~~~~~~~   Complete documentation of historical and exam elements from today's encounter can be found in the full encounter summary report (not reduplicated in this progress note).  I personally obtained the chief complaint(s) and history of present illness.  I confirmed and edited as necessary the review of systems, past medical/surgical history, family history, social history, and examination findings as documented by others; and I examined the patient myself.  I personally reviewed the relevant tests, images, and reports as documented above.  I formulated and edited as necessary the assessment and plan and discussed the findings and management plan with the patient and family    Xiao Ann MD   of Ophthalmology.  Retina Service   Department of Ophthalmology and Visual Neurosciences   [facility]  Phone: [phone]   Fax: [phone]

## 2022-04-27 NOTE — PATIENT INSTRUCTIONS
Start maxitrol drops Three times a day x 1 week, then twice a day x 1 week and then once a day till finish   right eye. Ok to continue maxitrol ointment right eye after drops finish   atropine stop   Shield at bedtime  Activity restrictions, no water in the eye  Return precautions discussed    Follow up in 3 weeks with Optical Coherence Tomography

## 2022-04-27 NOTE — NURSING NOTE
Chief Complaints and History of Present Illnesses   Patient presents with     Post Op (Ophthalmology) Left Eye     POP  s/p 25GPPV, AFX, right eye for visually significant vitreous opacity 04/20/22.    The patient notes his vision is doing well.  He doesn't have eye pain.  He is using Maxitrol four times, Maxitrol ointment at bedtime and Atropine once daily.  DERRICK Carlson, COA 8:56 AM 04/27/2022       Chief Complaint(s) and History of Present Illness(es)     Post Op (Ophthalmology) Left Eye     Comments: POP  s/p 25GPPV, AFX, right eye for visually significant vitreous opacity 04/20/22.    The patient notes his vision is doing well.  He doesn't have eye pain.  He is using Maxitrol four times, Maxitrol ointment at bedtime and Atropine once daily.  DERRICK Carlson, COA 8:56 AM 04/27/2022

## 2022-07-19 DIAGNOSIS — H43.393 VITREOUS FLOATER, BILATERAL: ICD-10-CM

## 2022-07-19 DIAGNOSIS — H26.492 PCO (POSTERIOR CAPSULAR OPACIFICATION), LEFT: Primary | ICD-10-CM

## 2022-07-28 ENCOUNTER — OFFICE VISIT (OUTPATIENT)
Dept: OPHTHALMOLOGY | Facility: CLINIC | Age: 67
End: 2022-07-28
Attending: OPHTHALMOLOGY
Payer: COMMERCIAL

## 2022-07-28 DIAGNOSIS — H43.392 VITREOUS FLOATER, LEFT: Primary | ICD-10-CM

## 2022-07-28 DIAGNOSIS — Q14.1 CONGENITAL HYPERTROPHY OF RETINAL PIGMENT EPITHELIUM OF LEFT EYE: ICD-10-CM

## 2022-07-28 PROCEDURE — 92134 CPTRZ OPH DX IMG PST SGM RTA: CPT | Mod: 26 | Performed by: OPTOMETRIST

## 2022-07-28 PROCEDURE — G0463 HOSPITAL OUTPT CLINIC VISIT: HCPCS | Mod: 25

## 2022-07-28 PROCEDURE — 92134 CPTRZ OPH DX IMG PST SGM RTA: CPT | Performed by: OPHTHALMOLOGY

## 2022-07-28 PROCEDURE — 99213 OFFICE O/P EST LOW 20 MIN: CPT | Mod: GC | Performed by: OPTOMETRIST

## 2022-07-28 ASSESSMENT — TONOMETRY
IOP_METHOD: TONOPEN
OS_IOP_MMHG: 14
OD_IOP_MMHG: 17

## 2022-07-28 ASSESSMENT — CONF VISUAL FIELD
METHOD: COUNTING FINGERS
OD_NORMAL: 1
OS_NORMAL: 1

## 2022-07-28 ASSESSMENT — SLIT LAMP EXAM - LIDS
COMMENTS: DERMATOCHALASIS
COMMENTS: DERMATOCHALASIS

## 2022-07-28 ASSESSMENT — VISUAL ACUITY
METHOD: SNELLEN - LINEAR
OD_SC: 20/20
OD_SC+: -
OS_SC: 20/20

## 2022-07-28 ASSESSMENT — CUP TO DISC RATIO
OS_RATIO: 0.05
OD_RATIO: 0.05

## 2022-07-28 NOTE — NURSING NOTE
Chief Complaints and History of Present Illnesses   Patient presents with     Follow Up     Chief Complaint(s) and History of Present Illness(es)     Follow Up     Laterality: left eye    Frequency: constantly    Timing: throughout the day    Course: gradually improving    Associated symptoms: Negative for redness, flashes, floaters and itching    Pain scale: 0/10              Comments     PCO follow up  s/p 25GPPV, AFX, right eye for visually significant vitreous opacity 04/20/22   Feels VA much better. Will use Soothe XP prn on days when eyes itch, outdoor allergy exposure, itching.  Questions on if should start a certain supplement.  Interested in proceeding with surgery for LE. 'I still have a LE annoying floater.'  Sherron Earl, DESI COT 7:40 AM 07/28/2022

## 2022-08-01 ENCOUNTER — MYC MEDICAL ADVICE (OUTPATIENT)
Dept: OPHTHALMOLOGY | Facility: CLINIC | Age: 67
End: 2022-08-01

## 2022-08-04 ENCOUNTER — TELEPHONE (OUTPATIENT)
Dept: OPHTHALMOLOGY | Facility: CLINIC | Age: 67
End: 2022-08-04

## 2022-08-04 NOTE — TELEPHONE ENCOUNTER
Patient is scheduled for surgery with Dr. Xiao Ann     Spoke with: Fabricio     Date of Surgery: 10/25     Location: [facility]:  [address] [place] [id number]     H&P will be completed at: [facility] testing: At home    Post Op scheduled on 10/26, 11/02, and 11/17     Surgery packet was mailed 08/04     Additional comments: Advised RN will call 1 - 3 business days prior with arrival time and instructions. Informed patient they will need an adult  and responsible adult to stay with for 24 hours following surgery

## 2022-08-04 NOTE — TELEPHONE ENCOUNTER
Patient is scheduled for surgery with Dr. Xiao Ann     Spoke with: Fabricio     Date of Surgery: 10/25     Location: [facility]:  [address] [place] [id number]     H&P will be completed at: [facility] testing: At home     Post Op scheduled on 10/26, 11/02, and 11/17     Surgery packet was mailed 08/04     Additional comments: Advised RN will call 1 - 3 business days prior with arrival time and instructions. Informed patient they will need an adult  and responsible adult to stay with for 24 hours following surgery

## 2022-10-10 ENCOUNTER — HEALTH MAINTENANCE LETTER (OUTPATIENT)
Age: 67
End: 2022-10-10

## 2022-10-24 ENCOUNTER — ANESTHESIA EVENT (OUTPATIENT)
Dept: SURGERY | Facility: AMBULATORY SURGERY CENTER | Age: 67
End: 2022-10-24
Payer: COMMERCIAL

## 2022-10-25 ENCOUNTER — HOSPITAL ENCOUNTER (OUTPATIENT)
Facility: AMBULATORY SURGERY CENTER | Age: 67
Discharge: HOME | End: 2022-10-25
Attending: OPHTHALMOLOGY
Payer: COMMERCIAL

## 2022-10-25 ENCOUNTER — ANESTHESIA (OUTPATIENT)
Dept: SURGERY | Facility: AMBULATORY SURGERY CENTER | Age: 67
End: 2022-10-25
Payer: COMMERCIAL

## 2022-10-25 VITALS
DIASTOLIC BLOOD PRESSURE: 85 MMHG | BODY MASS INDEX: 29.78 KG/M2 | OXYGEN SATURATION: 99 % | HEIGHT: 70 IN | SYSTOLIC BLOOD PRESSURE: 117 MMHG | TEMPERATURE: 97.1 F | WEIGHT: 208 LBS | HEART RATE: 77 BPM | RESPIRATION RATE: 18 BRPM

## 2022-10-25 DIAGNOSIS — H43.393 VITREOUS FLOATER, BILATERAL: ICD-10-CM

## 2022-10-25 DIAGNOSIS — Z48.810 AFTERCARE FOLLOWING SURGERY OF A SENSE ORGAN: Primary | ICD-10-CM

## 2022-10-25 LAB — GLUCOSE BLDC GLUCOMTR-MCNC: 147 MG/DL (ref 70–99)

## 2022-10-25 PROCEDURE — 82962 GLUCOSE BLOOD TEST: CPT | Performed by: PATHOLOGY

## 2022-10-25 PROCEDURE — 67036 REMOVAL OF INNER EYE FLUID: CPT | Mod: LT

## 2022-10-25 PROCEDURE — 67036 REMOVAL OF INNER EYE FLUID: CPT | Mod: LT | Performed by: OPHTHALMOLOGY

## 2022-10-25 RX ORDER — SODIUM CHLORIDE, SODIUM LACTATE, POTASSIUM CHLORIDE, CALCIUM CHLORIDE 600; 310; 30; 20 MG/100ML; MG/100ML; MG/100ML; MG/100ML
INJECTION, SOLUTION INTRAVENOUS CONTINUOUS
Status: DISCONTINUED | OUTPATIENT
Start: 2022-10-25 | End: 2022-10-26 | Stop reason: HOSPADM

## 2022-10-25 RX ORDER — ATROPINE SULFATE 10 MG/ML
1 SOLUTION/ DROPS OPHTHALMIC DAILY
Start: 2022-10-25

## 2022-10-25 RX ORDER — BALANCED SALT SOLUTION 6.4; .75; .48; .3; 3.9; 1.7 MG/ML; MG/ML; MG/ML; MG/ML; MG/ML; MG/ML
SOLUTION OPHTHALMIC DAILY PRN
Status: DISCONTINUED | OUTPATIENT
Start: 2022-10-25 | End: 2022-10-25 | Stop reason: HOSPADM

## 2022-10-25 RX ORDER — TETRACAINE HYDROCHLORIDE 5 MG/ML
SOLUTION OPHTHALMIC DAILY PRN
Status: DISCONTINUED | OUTPATIENT
Start: 2022-10-25 | End: 2022-10-25 | Stop reason: HOSPADM

## 2022-10-25 RX ORDER — DEXAMETHASONE SODIUM PHOSPHATE 4 MG/ML
INJECTION, SOLUTION INTRA-ARTICULAR; INTRALESIONAL; INTRAMUSCULAR; INTRAVENOUS; SOFT TISSUE DAILY PRN
Status: DISCONTINUED | OUTPATIENT
Start: 2022-10-25 | End: 2022-10-25 | Stop reason: HOSPADM

## 2022-10-25 RX ORDER — LIDOCAINE 40 MG/G
CREAM TOPICAL
Status: DISCONTINUED | OUTPATIENT
Start: 2022-10-25 | End: 2022-10-26 | Stop reason: HOSPADM

## 2022-10-25 RX ORDER — ONDANSETRON 4 MG/1
4 TABLET, ORALLY DISINTEGRATING ORAL EVERY 30 MIN PRN
Status: DISCONTINUED | OUTPATIENT
Start: 2022-10-25 | End: 2022-10-26 | Stop reason: HOSPADM

## 2022-10-25 RX ORDER — FENTANYL CITRATE 50 UG/ML
25 INJECTION, SOLUTION INTRAMUSCULAR; INTRAVENOUS
Status: DISCONTINUED | OUTPATIENT
Start: 2022-10-25 | End: 2022-10-26 | Stop reason: HOSPADM

## 2022-10-25 RX ORDER — ONDANSETRON 2 MG/ML
INJECTION INTRAMUSCULAR; INTRAVENOUS PRN
Status: DISCONTINUED | OUTPATIENT
Start: 2022-10-25 | End: 2022-10-25

## 2022-10-25 RX ORDER — NEOMYCIN POLYMYXIN B SULFATES AND DEXAMETHASONE 3.5; 10000; 1 MG/ML; [USP'U]/ML; MG/ML
1 SUSPENSION/ DROPS OPHTHALMIC 4 TIMES DAILY
Qty: 5 ML | Refills: 1 | Status: SHIPPED | OUTPATIENT
Start: 2022-10-25

## 2022-10-25 RX ORDER — LIDOCAINE HYDROCHLORIDE 20 MG/ML
INJECTION, SOLUTION INFILTRATION; PERINEURAL PRN
Status: DISCONTINUED | OUTPATIENT
Start: 2022-10-25 | End: 2022-10-25

## 2022-10-25 RX ORDER — ACETAMINOPHEN 325 MG/1
975 TABLET ORAL ONCE
Status: COMPLETED | OUTPATIENT
Start: 2022-10-25 | End: 2022-10-25

## 2022-10-25 RX ORDER — FENTANYL CITRATE 50 UG/ML
INJECTION, SOLUTION INTRAMUSCULAR; INTRAVENOUS PRN
Status: DISCONTINUED | OUTPATIENT
Start: 2022-10-25 | End: 2022-10-25

## 2022-10-25 RX ORDER — HYDROMORPHONE HYDROCHLORIDE 1 MG/ML
0.2 INJECTION, SOLUTION INTRAMUSCULAR; INTRAVENOUS; SUBCUTANEOUS EVERY 5 MIN PRN
Status: DISCONTINUED | OUTPATIENT
Start: 2022-10-25 | End: 2022-10-26 | Stop reason: HOSPADM

## 2022-10-25 RX ORDER — PROPOFOL 10 MG/ML
INJECTION, EMULSION INTRAVENOUS PRN
Status: DISCONTINUED | OUTPATIENT
Start: 2022-10-25 | End: 2022-10-25

## 2022-10-25 RX ORDER — CYCLOPENTOLAT/TROPIC/PHENYLEPH 1%-1%-2.5%
1 DROPS (EA) OPHTHALMIC (EYE)
Status: COMPLETED | OUTPATIENT
Start: 2022-10-25 | End: 2022-10-25

## 2022-10-25 RX ORDER — MEPERIDINE HYDROCHLORIDE 25 MG/ML
12.5 INJECTION INTRAMUSCULAR; INTRAVENOUS; SUBCUTANEOUS
Status: DISCONTINUED | OUTPATIENT
Start: 2022-10-25 | End: 2022-10-26 | Stop reason: HOSPADM

## 2022-10-25 RX ORDER — OXYCODONE HYDROCHLORIDE 5 MG/1
5 TABLET ORAL EVERY 4 HOURS PRN
Status: DISCONTINUED | OUTPATIENT
Start: 2022-10-25 | End: 2022-10-26 | Stop reason: HOSPADM

## 2022-10-25 RX ORDER — FENTANYL CITRATE 50 UG/ML
25 INJECTION, SOLUTION INTRAMUSCULAR; INTRAVENOUS EVERY 5 MIN PRN
Status: DISCONTINUED | OUTPATIENT
Start: 2022-10-25 | End: 2022-10-26 | Stop reason: HOSPADM

## 2022-10-25 RX ORDER — PROPARACAINE HYDROCHLORIDE 5 MG/ML
1 SOLUTION/ DROPS OPHTHALMIC ONCE
Status: COMPLETED | OUTPATIENT
Start: 2022-10-25 | End: 2022-10-25

## 2022-10-25 RX ORDER — ONDANSETRON 2 MG/ML
4 INJECTION INTRAMUSCULAR; INTRAVENOUS EVERY 30 MIN PRN
Status: DISCONTINUED | OUTPATIENT
Start: 2022-10-25 | End: 2022-10-26 | Stop reason: HOSPADM

## 2022-10-25 RX ADMIN — Medication 1 DROP: at 08:05

## 2022-10-25 RX ADMIN — LIDOCAINE HYDROCHLORIDE 40 MG: 20 INJECTION, SOLUTION INFILTRATION; PERINEURAL at 09:10

## 2022-10-25 RX ADMIN — PROPARACAINE HYDROCHLORIDE 1 DROP: 5 SOLUTION/ DROPS OPHTHALMIC at 08:05

## 2022-10-25 RX ADMIN — Medication 1 DROP: at 08:11

## 2022-10-25 RX ADMIN — FENTANYL CITRATE 50 MCG: 50 INJECTION, SOLUTION INTRAMUSCULAR; INTRAVENOUS at 09:06

## 2022-10-25 RX ADMIN — PROPOFOL 50 MG: 10 INJECTION, EMULSION INTRAVENOUS at 09:11

## 2022-10-25 RX ADMIN — ONDANSETRON 4 MG: 2 INJECTION INTRAMUSCULAR; INTRAVENOUS at 09:06

## 2022-10-25 RX ADMIN — SODIUM CHLORIDE, SODIUM LACTATE, POTASSIUM CHLORIDE, CALCIUM CHLORIDE: 600; 310; 30; 20 INJECTION, SOLUTION INTRAVENOUS at 08:15

## 2022-10-25 RX ADMIN — Medication 1 DROP: at 08:14

## 2022-10-25 RX ADMIN — ACETAMINOPHEN 975 MG: 325 TABLET ORAL at 08:03

## 2022-10-25 NOTE — BRIEF OP NOTE
[facility]    Brief Operative Note    Pre-operative diagnosis: Vitreous floater, left [H43.392]  Post-operative diagnosis Same as pre-operative diagnosis    Procedure: Procedure(s):  LEFT EYE VITRECTOMY, PARS PLANA APPROACH, USING 25-GAUGE INSTRUMENTS/ possible air fluid exchange/ possible endolaser  Surgeon: Surgeon(s) and Role:     * Xiao Ann MD - Primary  Anesthesia: MAC with Retrobulbar   Estimated Blood Loss: 0 mL from 10/25/2022  9:04 AM to 10/25/2022  9:48 AM      Drains: None  Specimens: * No specimens in log *  Findings:   None.  Complications: None.  Implants: * No implants in log *

## 2022-10-25 NOTE — DISCHARGE INSTRUCTIONS
POST-OPERATIVE INSTRUCTIONS FOLLOWING RETINA SURGERY    Xiao Ann MD  Department of Ophthalmology  [facility]  [phone]      ACTIVITY:  No heavy lifting for 2 weeks after surgery.  No swimming for 4 weeks after surgery.  It is OK for you to shower, to wash your face, and to wash your hair.  Allow the shower to hit the top of your head and wash down your face.  Do not hit your eye directly with the jet from the shower.  Keep your eye covered with the shield when you sleep for 2 weeks after surgery.  If your shield has a tab, it is designed to go over the bridge of your nose.  Place one piece of tape diagonally from the center of your forehead to the side of your cheek to secure the shield.   During the daytime, you can use either the shield or your regular eyeglasses or sunglasses to protect your eye.    EYE DROPS  Use these drops after surgery.  When using more than one drop, separate them by 3 minutes between drops.  Common times to place drops are breakfast, lunch, dinner, and bedtime.  Do not stop your drops without discussing with our office.  If you run out before your appointment, call and we will send in a refill.    Atropine 1 time per day  Maxitrol ophthalmic suspension  4 times per day  Maxitrol ointment as needed    WHAT TO EXPECT  It is common for the eye to to have a blood tinged discharge for a few days after surgery  It may feel irritated (as if something were in your eye), for there to be clear discharge (thicker in the mornings upon awakening), and for it to be bloodshot for 2-3 weeks following retina surgery.   You might feel itchiness, please do not rub your eye, instead it is OK to use artificial tears to minimize symptoms.  Your vision is also commonly decreased during this time due to the bubble.    WHAT TO WATCH OUT FOR  If you experience any of the following, you should call immediately:  Increasing pain  Increasing nausea or vomiting  Increasing redness  Worsening  or darkening of the vision  New flashing lights or floaters      For any of the symptoms listed above, or for other concerns, call [phone] and ask to speak to the clinic nurse.  If you call after hours, follow to prompts to reach the doctor on call.    [facility]  Home Care Following Anesthesia  For 24 hours after surgery:  Get plenty of rest.  A responsible adult must stay with you for at least 24 hours after you leave the surgery center.  Do not drive or use heavy equipment.  If you have weakness or tingling, don't drive or use heavy equipment until this feeling goes away.   Do not drink alcohol.   Avoid strenuous or risky activities.  Ask for help when climbing stairs.  You may feel lightheaded.  IF so, sit for a few minutes before standing.  Have someone help you get up.   If you have nausea (feel sick to your stomach): Drink only clear liquids such as apple juice, ginger ale, broth or 7-Up.  Rest may also help.  Be sure to drink enough fluids.  Move to a regular diet as you feel able.   You may have a slight fever.  Call the doctor if your fever is over 100 F (37.7 C) (taken under the tongue) or lasts longer than 24 hours.  You may have a dry mouth, a sore throat, muscle aches or trouble sleeping. These should go away after 24 hours.  Do not make important or legal decisions.   It is recommended to avoid smoking.               Tips for taking pain medications  To get the best pain relief possible, remember these points:  Take pain medications as directed, before pain becomes severe.  Pain medication can upset your stomach: taking it with food may help.  Constipation is a common side effect of pain medication. Drink plenty of  fluids.  Eat foods high in fiber. Take a stool softener if recommended by your doctor or pharmacist.  Do not drink alcohol, drive or operate machinery while taking pain medications.  Ask about other ways to control pain, such as with heat, ice  or relaxation.    Tylenol/Acetaminophen Consumption  To help encourage the safe use of acetaminophen, the makers of TYLENOL  have lowered the maximum daily dose for single-ingredient Extra Strength TYLENOL  (acetaminophen) products sold in the [place] from 8 pills per day (4,000 mg) to 6 pills per day (3,000 mg). The dosing interval has also changed from 2 pills every 4-6 hours to 2 pills every 6 hours.  If you feel your pain relief is insufficient, you may take Tylenol/Acetaminophen in addition to your narcotic pain medication.   Be careful not to exceed 3,000 mg of Tylenol/Acetaminophen in a 24 hour period from all sources.  If you are taking extra strength Tylenol/acetaminophen (500 mg), the maximum dose is 6 tablets in 24 hours.  If you are taking regular strength acetaminophen (325 mg), the maximum dose is 9 tablets in 24 hours.    Call a doctor for any of the following:  Signs of infection (fever, growing tenderness at the surgery site, a large amount of drainage or bleeding, severe pain, foul-smelling drainage, redness, swelling).  It has been over 8 to 10 hours since surgery and you are still not able to urinate (pass water).  Headache for over 24 hours.  Numbness, tingling or weakness the day after surgery (if you had spinal anesthesia).  Signs of Covid-19 infection (temperature over 100 degrees, shortness of breath, cough, loss of taste/smell, generalized body aches, persistent headache, chills, sore throat, nausea/vomiting/diarrhea)  Your doctor is:       Dr. Xiao Ann, Ophthalmology: [phone]               Or dial [phone] and ask for the resident on call for:  Ophthalmology  For emergency care, call the:  [facility]:  [phone] (TTY for hearing impaired: [phone])

## 2022-10-25 NOTE — ANESTHESIA PREPROCEDURE EVALUATION
Anesthesia Pre-Procedure Evaluation    Patient: Fabricio Goeva   MRN: [id number] : 1955        Procedure : Procedure(s):  LEFT EYE VITRECTOMY, PARS PLANA APPROACH, USING 25-GAUGE INSTRUMENTS/ possible air fluid exchange/ possible endolaser          Past Medical History:   Diagnosis Date     Diabetes (H)       Past Surgical History:   Procedure Laterality Date     CATARACT IOL, RT/LT       VITRECTOMY PARSPLANA WITH 25 GAUGE SYSTEM Right 2022    Procedure: RIGHT EYE VITRECTOMY, PARS PLANA APPROACH, USING 25-GAUGE INSTRUMENTS;  Surgeon: Xiao Ann MD;  Location: [facility] OR      Allergies   Allergen Reactions     Hmg-Coa-R Inhibitors Other (See Comments)     Intolerance with myalgia-severe  Statin Intolerance Documentation  2. Shared decision making discussion with patient regarding statins and patient choses to not trial a second or additional statin.     Isosorbide Headache     Severe      Codeine Rash     Lightheaded-feels like going to vomit     Semaglutide Rash     complete body rash 6-8 weeks to get out of system      Social History     Tobacco Use     Smoking status: Never     Smokeless tobacco: Never   Substance Use Topics     Alcohol use: Yes     Comment: occ      Wt Readings from Last 1 Encounters:   10/25/22 94.3 kg (208 lb)        Anesthesia Evaluation            ROS/MED HX  ENT/Pulmonary:  - neg pulmonary ROS     Neurologic:  - neg neurologic ROS     Cardiovascular:  - neg cardiovascular ROS     METS/Exercise Tolerance:     Hematologic:  - neg hematologic  ROS     Musculoskeletal:  - neg musculoskeletal ROS     GI/Hepatic:  - neg GI/hepatic ROS     Renal/Genitourinary:  - neg Renal ROS     Endo:  - neg endo ROS   (+) type II DM,     Psychiatric/Substance Use:  - neg psychiatric ROS     Infectious Disease:  - neg infectious disease ROS     Malignancy:  - neg malignancy ROS     Other:  - neg other ROS          Physical Exam    Airway  airway exam normal      Mallampati: II   TM  distance: > 3 FB   Neck ROM: full   Mouth opening: > 3 cm    Respiratory Devices and Support         Dental  no notable dental history         Cardiovascular          Rhythm and rate: regular and normal     Pulmonary   pulmonary exam normal        breath sounds clear to auscultation           OUTSIDE LABS:  CBC: No results found for: WBC, HGB, HCT, PLT  BMP:   Lab Results   Component Value Date     (H) 10/25/2022     (H) 04/19/2022     COAGS: No results found for: PTT, INR, FIBR  POC: No results found for: BGM, HCG, HCGS  HEPATIC: No results found for: ALBUMIN, PROTTOTAL, ALT, AST, GGT, ALKPHOS, BILITOTAL, BILIDIRECT, SURYA  OTHER: No results found for: PH, LACT, A1C, PAT, PHOS, MAG, LIPASE, AMYLASE, TSH, T4, T3, CRP, SED    Anesthesia Plan    ASA Status:  2   NPO Status:  NPO Appropriate    Anesthesia Type: MAC.     - Reason for MAC: immobility needed, straight local not clinically adequate   Induction: Intravenous.           Consents    Anesthesia Plan(s) and associated risks, benefits, and realistic alternatives discussed. Questions answered and patient/representative(s) expressed understanding.    - Discussed:     - Discussed with:  Patient      - Extended Intubation/Ventilatory Support Discussed: No.      - Patient is DNR/DNI Status: No    Use of blood products discussed: No .     Postoperative Care    Pain management: IV analgesics, Oral pain medications, Multi-modal analgesia.   PONV prophylaxis: Ondansetron (or other 5HT-3), Background Propofol Infusion     Comments:                Claudio Talavera MD

## 2022-10-25 NOTE — ANESTHESIA CARE TRANSFER NOTE
Patient: Fabricio Govea    Procedure: Procedure(s):  LEFT EYE VITRECTOMY, PARS PLANA APPROACH, USING 25-GAUGE INSTRUMENTS/ possible air fluid exchange/ possible endolaser       Diagnosis: Vitreous floater, left [H43.392]  Diagnosis Additional Information: No value filed.    Anesthesia Type:   MAC     Note:    Oropharynx: oropharynx clear of all foreign objects and spontaneously breathing  Level of Consciousness: awake  Oxygen Supplementation: room air    Independent Airway: airway patency satisfactory and stable  Dentition: dentition unchanged  Vital Signs Stable: post-procedure vital signs reviewed and stable  Report to RN Given: handoff report given  Patient transferred to: Phase II    Handoff Report: Identifed the Patient, Identified the Reponsible Provider, Reviewed the pertinent medical history, Discussed the surgical course, Reviewed Intra-OP anesthesia mangement and issues during anesthesia, Set expectations for post-procedure period and Allowed opportunity for questions and acknowledgement of understanding      Vitals:  Vitals Value Taken Time   BP     Temp     Pulse     Resp     SpO2         Electronically Signed By: PAOLA Childress CRNA  October 25, 2022  9:42 AM

## 2022-10-25 NOTE — OP NOTE
SURGEON: TRUDI HERRERA MD  FELLOW: MARGARITA PACHECO MD, MPH  PREOPERATIVE DIAGNOSIS: PVD with symptomatic floaters, LEFT EYE  POSTOPERATIVE DIAGNOSIS: same   NAME OF THE PROCEDURE:   1. 25 gauge pars plana vitectomy, partial air-fluid exchange LEFT EYE  ANESTHESIA: Monitored Anesthesia Care and Retrobulbar block left eye   COMPLICATIONS: none   INDICATIONS: Fabricio Govea is 67 year old patient with diagnosis of PVD with symptomatic floaters, LEFT EYE.  DESCRIPTION OF THE PROCEDURE   The patient was brought into the OR where Monitored Anesthesia Care was given by the anesthesia department.  A retrobulbar block consistent of a 1:1 mixtures of 2% Lidocaine and 0.50 % of marcaine with epinephrine and wydase was administered.    The eye was then prepped and draped in the usual fashion for ophthalmic surgery, including the installation of one drop of povidone iodine.  Attention was then turned to the vitrectomy. Marks were made on the sclera inferotemporally, superotemporally, and superonasally 3.5 mm posterior to the limbus. The 25g transscleral cannulas were inserted through the sclera using the trocars. The infusion cannula was connected inferotemporally and directly visualized to verify it was in the correct location. The vitrector handpiece and endoilluiminator were placed in the eye.   A Pars plana vitrectomy (PPV) was performed and the vitreous was stained with kenalog. Scleral depression was performed and no breaks were found.   Next a partial air-fluid exchange was performed.   The cannulas were removed. The sclerotomies were sutured with 6-0 plain suture and were airtight. The pressure was checked and verified to be appropriate. The lid speculum was removed. The eye was cleaned with wet and dry gauze. Subconjunctival injections of Ancef and decadron were administered. A drop of atropine and maxitrol ointment was placed in the eye. An eye pad and farah shield were taped over the eye.   The patient tolerated  well the procedure and was discharge to the post-operative unit in stable conditions with no complications.  The surgery was assisted by Dr. Carlos Landeros. Due to the delicate and complex nature of this surgery, an assistant was required. He assisted with vitrectomy. I was present for the entire surgery.

## 2022-10-25 NOTE — ANESTHESIA POSTPROCEDURE EVALUATION
Patient: Fabricio Govea    Procedure: Procedure(s):  LEFT EYE VITRECTOMY, PARS PLANA APPROACH, USING 25-GAUGE INSTRUMENTS/ possible air fluid exchange/ possible endolaser       Anesthesia Type:  MAC    Note:  Disposition: Outpatient   Postop Pain Control: Uneventful            Sign Out: Well controlled pain   PONV: No   Neuro/Psych: Uneventful            Sign Out: Acceptable/Baseline neuro status   Airway/Respiratory: Uneventful            Sign Out: Acceptable/Baseline resp. status   CV/Hemodynamics: Uneventful            Sign Out: Acceptable CV status   Other NRE: NONE   DID A NON-ROUTINE EVENT OCCUR? No           Last vitals:  Vitals Value Taken Time   /85 10/25/22 1028   Temp 36.2  C (97.1  F) 10/25/22 1028   Pulse 77 10/25/22 1028   Resp 18 10/25/22 1028   SpO2 99 % 10/25/22 1028       Electronically Signed By: Claudio Talavera MD  October 25, 2022  1:19 PM

## 2022-10-25 NOTE — INTERVAL H&P NOTE
"I have reviewed the surgical (or preoperative) H&P that is linked to this encounter, and examined the patient. There are no significant changes    Clinical Conditions Present on Arrival:  Clinically Significant Risk Factors Present on Admission                    # Overweight: Estimated body mass index is 29.84 kg/m  as calculated from the following:    Height as of this encounter: 1.778 m (5' 10\").    Weight as of this encounter: 94.3 kg (208 lb).       "

## 2022-10-26 ENCOUNTER — OFFICE VISIT (OUTPATIENT)
Dept: OPHTHALMOLOGY | Facility: CLINIC | Age: 67
End: 2022-10-26
Attending: OPHTHALMOLOGY
Payer: COMMERCIAL

## 2022-10-26 DIAGNOSIS — Z48.810 AFTERCARE FOLLOWING SURGERY OF A SENSORY ORGAN: Primary | ICD-10-CM

## 2022-10-26 PROCEDURE — 99024 POSTOP FOLLOW-UP VISIT: CPT | Mod: GC | Performed by: OPHTHALMOLOGY

## 2022-10-26 PROCEDURE — G0463 HOSPITAL OUTPT CLINIC VISIT: HCPCS

## 2022-10-26 ASSESSMENT — TONOMETRY
IOP_METHOD: TONOPEN
OS_IOP_MMHG: 15
OD_IOP_MMHG: 20

## 2022-10-26 ASSESSMENT — CUP TO DISC RATIO: OS_RATIO: 0.05

## 2022-10-26 ASSESSMENT — VISUAL ACUITY
OD_SC: 20/20
OS_SC+: -1/+3
OD_SC+: -1
METHOD: SNELLEN - LINEAR
OS_SC: 20/25

## 2022-10-26 ASSESSMENT — SLIT LAMP EXAM - LIDS: COMMENTS: DERMATOCHALASIS

## 2022-10-26 NOTE — PATIENT INSTRUCTIONS
Start maxitrol drops three times a day left eye x 10 days  Start atropine once a day left eye  Follow-up 1 week

## 2022-10-26 NOTE — PROGRESS NOTES
CC:  s/p 25GPPV, AFX, right eye for visually significant vitreous opacity 04/20/22     INTERVAL HISTORY -     Excellent VA and IOP, retina attached, happy with surgical results. Stable floaters OS with no flashing lights. Reports being bothered by floaters OS.      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 History of type 2 diabetes and coronary artery disease s/p CABG August 2021.      Has had persistent floaters in both eyes for the past few years that have been bothersome. Left eye appears to be somewhat worse than the right.      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 Whitney)   - s/p YAG OD 3/23/2022   - s/p YAG OS 3/16/22     #CHRPE left eye   - Baseline pic done 3/16/22; stable compared to photos  - Observe  - Annual exams        # Early Age related macular degeneration left eye   - Weekly Amsler Grid use was discussed and training performed.  - A diet of leafy green vegetables was  encouraged.  - Return precautions were given      RTC: 1 week VTD (as scheduled)     ~~~~~~~~~~~~~~~~~~~~~~~~~~~~~~~~~~  Rosendo Rich MD  Resident Physician, PGY-3  Department of Ophthalmology  10/26/22 9:12 AM    ~~~~~~~~~~~~~~~~~~~~~~~~~~~~~~~~~~   Complete documentation of historical and exam elements from today's encounter can be found in the full encounter summary report (not reduplicated in this progress note).  I personally obtained the chief complaint(s) and history of present illness.  I confirmed and edited as necessary the review of systems, past medical/surgical history, family history, social history, and examination findings as documented by others; and I examined the patient myself.  I personally reviewed the relevant tests, images, and reports as documented above.  I formulated and edited as necessary the assessment and plan and discussed the findings and management plan with the patient and family    Xiao Ann MD  Professor of Ophthalmology  Vitreo-Retinal surgeon   Department of Ophthalmology and Visual Neurosciences   [facility]  Phone: [phone]   Fax: [phone]

## 2022-10-26 NOTE — NURSING NOTE
Chief Complaints and History of Present Illnesses   Patient presents with     Post Op (Ophthalmology) Left Eye     Post 25 gauge pars plana vitectomy, partial air-fluid exchange, left eye on 10/25/2022     Chief Complaint(s) and History of Present Illness(es)     Post Op (Ophthalmology) Left Eye            Laterality: left eye    Associated symptoms: redness.  Negative for eye pain, tearing and headache (had a headache yesterday, but it is now resolved)    Pain scale: 0/10    Comments: Post 25 gauge pars plana vitectomy, partial air-fluid exchange, left eye on 10/25/2022          Comments    Patient returns to clinic for a one day post operative left eye exam. Patient denies having any current eye discomfort.  Patch was removed in office.      DESI Lindsey 8:51 AM  October 26, 2022

## 2022-10-27 ENCOUNTER — MYC MEDICAL ADVICE (OUTPATIENT)
Dept: OPHTHALMOLOGY | Facility: CLINIC | Age: 67
End: 2022-10-27

## 2022-11-02 ENCOUNTER — OFFICE VISIT (OUTPATIENT)
Dept: OPHTHALMOLOGY | Facility: CLINIC | Age: 67
End: 2022-11-02
Attending: OPHTHALMOLOGY
Payer: COMMERCIAL

## 2022-11-02 DIAGNOSIS — Z48.810 AFTERCARE FOLLOWING SURGERY OF A SENSORY ORGAN: Primary | ICD-10-CM

## 2022-11-02 PROCEDURE — G0463 HOSPITAL OUTPT CLINIC VISIT: HCPCS

## 2022-11-02 PROCEDURE — 99024 POSTOP FOLLOW-UP VISIT: CPT | Performed by: OPHTHALMOLOGY

## 2022-11-02 ASSESSMENT — REFRACTION_WEARINGRX
OD_CYLINDER: +0.50
OS_AXIS: 153
OD_ADD: +2.25
OS_SPHERE: PLANO
OD_AXIS: 160
OD_SPHERE: +0.25
OS_CYLINDER: +0.75
SPECS_TYPE: PAL
OS_ADD: +2.25

## 2022-11-02 ASSESSMENT — CONF VISUAL FIELD
OD_INFERIOR_TEMPORAL_RESTRICTION: 0
OS_INFERIOR_TEMPORAL_RESTRICTION: 0
METHOD: COUNTING FINGERS
OD_SUPERIOR_NASAL_RESTRICTION: 0
OD_NORMAL: 1
OS_NORMAL: 1
OS_INFERIOR_NASAL_RESTRICTION: 0
OS_SUPERIOR_NASAL_RESTRICTION: 0
OD_SUPERIOR_TEMPORAL_RESTRICTION: 0
OS_SUPERIOR_TEMPORAL_RESTRICTION: 0
OD_INFERIOR_NASAL_RESTRICTION: 0

## 2022-11-02 ASSESSMENT — TONOMETRY
OD_IOP_MMHG: 16
OS_IOP_MMHG: 18
IOP_METHOD: TONOPEN

## 2022-11-02 ASSESSMENT — VISUAL ACUITY
OS_CC+: -2
CORRECTION_TYPE: GLASSES
METHOD: SNELLEN - LINEAR
OD_CC: 20/20
OS_CC: 20/20
OD_CC+: -1

## 2022-11-02 ASSESSMENT — CUP TO DISC RATIO: OS_RATIO: 0.05

## 2022-11-02 ASSESSMENT — SLIT LAMP EXAM - LIDS: COMMENTS: DERMATOCHALASIS

## 2022-11-02 NOTE — PROGRESS NOTES
CC:  Post-op left eye     INTERVAL HISTORY -   Excellent VA; happy with surgical results.     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 History of type 2 diabetes and coronary artery disease s/p CABG August 2021.      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 Whitney)   - s/p YAG OD 3/23/2022   - s/p YAG OS 3/16/22     #CHRPE left eye   - Baseline pic done 3/16/22; stable compared to photos  - Observe  - Annual exams        # Early Age related macular degeneration left eye   - Weekly Amsler Grid use was discussed and training performed.  - A diet of leafy green vegetables was encouraged.  - Return precautions were given      RTC: 3 week VTD both eyes and Optical Coherence Tomography  Retina detachment precautions were discussed with the patient (presence or increased in flashes, floaters or a curtain in the visual field) and was asked to return if any of the those occur      ~~~~~~~~~~~~~~~~~~~~~~~~~~~~~~~~~~   Complete documentation of historical and exam elements from today's encounter can be found in the full encounter summary report (not reduplicated in this progress note).  I personally obtained the chief complaint(s) and history of present illness.  I confirmed and edited as necessary the review of systems, past medical/surgical history, family history, social history, and examination findings as documented by others; and I examined the patient myself.  I personally reviewed the relevant tests, images, and reports as documented above.  I formulated and edited as necessary the assessment and plan and discussed the findings and management plan with the patient and family    Xiao Ann MD  Professor of Ophthalmology  Vitreo-Retinal surgeon   Department of Ophthalmology and Visual Neurosciences   [facility]  Phone: [phone]   Fax: [phone]

## 2022-11-02 NOTE — NURSING NOTE
Chief Complaints and History of Present Illnesses   Patient presents with     Follow Up     Chief Complaint(s) and History of Present Illness(es)     Follow Up           Comments    Patient states that his vision in the LE is clear. He states that the bubble is pretty much gone. He does notice some small dots, unsure if it is the bubble breaking apart. Wondering when the redness with go away. He has some dryness/scratchiness in the LE.     Ocular Meds:maxitrol TID In the LE   Atropine once a day in the LE     Nolan WEEKS, November 2, 2022 8:52 AM

## 2022-11-04 DIAGNOSIS — H43.393 VITREOUS FLOATER, BILATERAL: Primary | ICD-10-CM

## 2022-11-17 ENCOUNTER — OFFICE VISIT (OUTPATIENT)
Dept: OPHTHALMOLOGY | Facility: CLINIC | Age: 67
End: 2022-11-17
Attending: OPHTHALMOLOGY
Payer: COMMERCIAL

## 2022-11-17 DIAGNOSIS — H43.393 VITREOUS FLOATER, BILATERAL: ICD-10-CM

## 2022-11-17 PROCEDURE — 99207 OCT RETINA SPECTRALIS OU (BOTH EYE): CPT | Mod: 26 | Performed by: OPHTHALMOLOGY

## 2022-11-17 PROCEDURE — 92134 CPTRZ OPH DX IMG PST SGM RTA: CPT | Performed by: OPHTHALMOLOGY

## 2022-11-17 PROCEDURE — 99024 POSTOP FOLLOW-UP VISIT: CPT | Mod: GC | Performed by: OPHTHALMOLOGY

## 2022-11-17 PROCEDURE — G0463 HOSPITAL OUTPT CLINIC VISIT: HCPCS | Mod: 25

## 2022-11-17 ASSESSMENT — REFRACTION_WEARINGRX
SPECS_TYPE: PAL
OD_AXIS: 160
OS_AXIS: 153
OD_ADD: +2.25
OS_ADD: +2.25
OD_SPHERE: +0.25
OS_CYLINDER: +0.75
OS_SPHERE: PLANO
OD_CYLINDER: +0.50

## 2022-11-17 ASSESSMENT — CONF VISUAL FIELD
METHOD: COUNTING FINGERS
OD_SUPERIOR_NASAL_RESTRICTION: 0
OS_SUPERIOR_TEMPORAL_RESTRICTION: 0
OD_SUPERIOR_TEMPORAL_RESTRICTION: 0
OD_INFERIOR_NASAL_RESTRICTION: 0
OS_SUPERIOR_NASAL_RESTRICTION: 0
OD_NORMAL: 1
OS_INFERIOR_TEMPORAL_RESTRICTION: 0
OS_NORMAL: 1
OD_INFERIOR_TEMPORAL_RESTRICTION: 0
OS_INFERIOR_NASAL_RESTRICTION: 0

## 2022-11-17 ASSESSMENT — VISUAL ACUITY
OS_CC+: -2
METHOD: SNELLEN - LINEAR
OD_CC: 20/20
OS_CC: 20/25

## 2022-11-17 ASSESSMENT — CUP TO DISC RATIO
OD_RATIO: 0.05
OS_RATIO: 0.05

## 2022-11-17 ASSESSMENT — TONOMETRY
IOP_METHOD: TONOPEN
OS_IOP_MMHG: 18
OD_IOP_MMHG: 17

## 2022-11-17 ASSESSMENT — SLIT LAMP EXAM - LIDS: COMMENTS: DERMATOCHALASIS

## 2022-11-17 NOTE — NURSING NOTE
Chief Complaints and History of Present Illnesses   Patient presents with     Post Op (Ophthalmology) Left Eye     Chief Complaint(s) and History of Present Illness(es)     Post Op (Ophthalmology) Left Eye           Comments    POP left eye s/p 25 g PPV/partial AFx (10/25/22).    The patient reports his left eye is getting better.  He has much less bruising.  He has a couple of very small dots that are getting smaller.  He appreciates the clear vision.  He has no eye pain.  He is using Maxitrol twice daily in the left eye.  Lisa Brandon, COA, COA 8:50 AM 11/17/2022

## 2022-11-17 NOTE — PROGRESS NOTES
CC:  Post-op left eye     INTERVAL HISTORY -   Excellent VA; happy with surgical results. Reports small specks that have improved since prior to surgery. 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 History of type 2 diabetes and coronary artery disease s/p CABG August 2021.      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 Whitney)   - s/p YAG OD 3/23/2022   - s/p YAG OS 3/16/22     #CHRPE left eye   - Baseline pic done 3/16/22; stable compared to photos  - Observe  - Annual exams    # Early Age related macular degeneration left eye   - Weekly Amsler Grid use was discussed and training performed.  - A diet of leafy green vegetables was encouraged.  - Return precautions were given      RTC:3 months VTD both eyes and Optical Coherence Tomography and optos  Retina detachment precautions were discussed with the patient (presence or increased in flashes, floaters or a curtain in the visual field) and was asked to return if any of the those occur     Uli Landeros MD  MPH  Vitreoretinal Fellow PGY-5  [facility]     ~~~~~~~~~~~~~~~~~~~~~~~~~~~~~~~~~~   Complete documentation of historical and exam elements from today's encounter can be found in the full encounter summary report (not reduplicated in this progress note).  I personally obtained the chief complaint(s) and history of present illness.  I confirmed and edited as necessary the review of systems, past medical/surgical history, family history, social history, and examination findings as documented by others; and I examined the patient myself.  I personally reviewed the relevant tests, images, and reports as documented above.  I formulated and edited as necessary the assessment and plan and discussed the findings and management plan with the patient and family    Xiao Ann MD  Professor of Ophthalmology  Vitreo-Retinal surgeon   Department of Ophthalmology and Visual Neurosciences   [facility]  Phone: [phone]   Fax: [phone]

## 2023-02-08 DIAGNOSIS — H43.393 VITREOUS FLOATER, BILATERAL: Primary | ICD-10-CM

## 2023-05-27 ENCOUNTER — HEALTH MAINTENANCE LETTER (OUTPATIENT)
Age: 68
End: 2023-05-27

## 2024-08-04 ENCOUNTER — HEALTH MAINTENANCE LETTER (OUTPATIENT)
Age: 69
End: 2024-08-04

## 2025-08-16 ENCOUNTER — HEALTH MAINTENANCE LETTER (OUTPATIENT)
Age: 70
End: 2025-08-16

## (undated) DEVICE — EYE SHIELD PLASTIC

## (undated) DEVICE — TUBING SUCTION 12"X1/4" N612

## (undated) DEVICE — ESU CORD BIPOLAR GREEN 10-4000

## (undated) DEVICE — DRAPE MICRO 41X81"

## (undated) DEVICE — EYE PACK 25GA CONSTELLATION 10,000 CPM PPK9380-02

## (undated) DEVICE — SU PLAIN 6-0 TG140-8 18" 1735G

## (undated) DEVICE — LINEN TOWEL PACK X5 5464

## (undated) DEVICE — PACK VITRECTOMY/RET CUSTOM ASC PQ15VRU12

## (undated) DEVICE — TAPE MICROPORE 1"X1.5YD 1530S-1

## (undated) DEVICE — TAPE MICROPORE 2"X1.5YD 1530S-2

## (undated) DEVICE — EYE NDL RETROBULBAR ATKINSON 25GA 1.5" 581637

## (undated) DEVICE — EYE CANN SOFT TIP 25GA FOR VALVED SET 8065149530

## (undated) DEVICE — SOL WATER IRRIG 500ML BOTTLE 2F7113

## (undated) DEVICE — GLOVE PROTEXIS MICRO 6.0  2D73PM60

## (undated) DEVICE — SYR 01ML 27GA 0.5" ECLIPSE 305789

## (undated) RX ORDER — ACETAMINOPHEN 325 MG/1
TABLET ORAL
Status: DISPENSED
Start: 2022-10-25

## (undated) RX ORDER — PROPOFOL 10 MG/ML
INJECTION, EMULSION INTRAVENOUS
Status: DISPENSED
Start: 2022-04-19

## (undated) RX ORDER — PROPOFOL 10 MG/ML
INJECTION, EMULSION INTRAVENOUS
Status: DISPENSED
Start: 2022-10-25

## (undated) RX ORDER — ACETAMINOPHEN 325 MG/1
TABLET ORAL
Status: DISPENSED
Start: 2022-04-19

## (undated) RX ORDER — FENTANYL CITRATE 50 UG/ML
INJECTION, SOLUTION INTRAMUSCULAR; INTRAVENOUS
Status: DISPENSED
Start: 2022-10-25

## (undated) RX ORDER — ONDANSETRON 2 MG/ML
INJECTION INTRAMUSCULAR; INTRAVENOUS
Status: DISPENSED
Start: 2022-10-25

## (undated) RX ORDER — LIDOCAINE HYDROCHLORIDE 20 MG/ML
INJECTION, SOLUTION EPIDURAL; INFILTRATION; INTRACAUDAL; PERINEURAL
Status: DISPENSED
Start: 2022-04-19

## (undated) RX ORDER — LIDOCAINE HYDROCHLORIDE 20 MG/ML
INJECTION, SOLUTION EPIDURAL; INFILTRATION; INTRACAUDAL; PERINEURAL
Status: DISPENSED
Start: 2022-10-25

## (undated) RX ORDER — FENTANYL CITRATE 50 UG/ML
INJECTION, SOLUTION INTRAMUSCULAR; INTRAVENOUS
Status: DISPENSED
Start: 2022-04-19